# Patient Record
Sex: FEMALE | Race: WHITE
[De-identification: names, ages, dates, MRNs, and addresses within clinical notes are randomized per-mention and may not be internally consistent; named-entity substitution may affect disease eponyms.]

---
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
## 2023-09-03 NOTE — DISCUSSION/SUMMARY
[FreeTextEntry1] : Beatriz is an 19 y/o female presenting for OCP.  She was previously on OrthoTriCyclen and tolerated well.  She stopped in 9/2018 but wants to restart because they were helpful with her painful periods.  Vital signs and physical exam within normal limits.\par \par - RTC in 3 months for follow up\par - Refill sent to pharmacy\par - Pregnancy test - negative\par - Discussed ACHES and reasons for returning to clinic sooner\par - Support and encouragement given No

## 2024-08-13 ENCOUNTER — APPOINTMENT (OUTPATIENT)
Dept: OBGYN | Facility: CLINIC | Age: 24
End: 2024-08-13
Payer: COMMERCIAL

## 2024-08-13 PROCEDURE — 99385 PREV VISIT NEW AGE 18-39: CPT

## 2024-08-13 PROCEDURE — 96127 BRIEF EMOTIONAL/BEHAV ASSMT: CPT

## 2024-08-13 PROCEDURE — 99395 PREV VISIT EST AGE 18-39: CPT

## 2024-08-13 PROCEDURE — 99459 PELVIC EXAMINATION: CPT
